# Patient Record
Sex: FEMALE | Race: WHITE
[De-identification: names, ages, dates, MRNs, and addresses within clinical notes are randomized per-mention and may not be internally consistent; named-entity substitution may affect disease eponyms.]

---

## 2017-10-19 ENCOUNTER — HOSPITAL ENCOUNTER (OUTPATIENT)
Dept: HOSPITAL 80 - CIMAGING | Age: 67
End: 2017-10-19
Attending: ORTHOPAEDIC SURGERY
Payer: COMMERCIAL

## 2017-10-19 DIAGNOSIS — Z01.818: Primary | ICD-10-CM

## 2017-10-19 DIAGNOSIS — M16.0: ICD-10-CM

## 2017-10-26 ENCOUNTER — HOSPITAL ENCOUNTER (INPATIENT)
Dept: HOSPITAL 80 - F3N | Age: 67
LOS: 1 days | Discharge: HOME | DRG: 470 | End: 2017-10-27
Attending: ORTHOPAEDIC SURGERY | Admitting: ORTHOPAEDIC SURGERY
Payer: COMMERCIAL

## 2017-10-26 VITALS — RESPIRATION RATE: 16 BRPM

## 2017-10-26 DIAGNOSIS — D68.51: ICD-10-CM

## 2017-10-26 DIAGNOSIS — M16.11: Primary | ICD-10-CM

## 2017-10-26 DIAGNOSIS — E03.9: ICD-10-CM

## 2017-10-26 LAB
INR PPP: 0.99 (ref 0.83–1.16)
PROTHROMBIN TIME: 13 SEC (ref 12–15)

## 2017-10-26 PROCEDURE — 0SR904Z REPLACEMENT OF RIGHT HIP JOINT WITH CERAMIC ON POLYETHYLENE SYNTHETIC SUBSTITUTE, OPEN APPROACH: ICD-10-PCS | Performed by: ORTHOPAEDIC SURGERY

## 2017-10-26 PROCEDURE — 8E0Y0CZ ROBOTIC ASSISTED PROCEDURE OF LOWER EXTREMITY, OPEN APPROACH: ICD-10-PCS | Performed by: ORTHOPAEDIC SURGERY

## 2017-10-26 RX ADMIN — FAMOTIDINE SCH MG: 20 TABLET, FILM COATED ORAL at 20:59

## 2017-10-26 RX ADMIN — OXYCODONE HYDROCHLORIDE PRN MG: 15 TABLET ORAL at 13:43

## 2017-10-26 RX ADMIN — OXYCODONE HYDROCHLORIDE PRN MG: 15 TABLET ORAL at 20:59

## 2017-10-26 RX ADMIN — OXYCODONE HYDROCHLORIDE PRN MG: 15 TABLET ORAL at 17:25

## 2017-10-26 RX ADMIN — Medication SCH MLS: at 13:41

## 2017-10-26 RX ADMIN — DOCUSATE SODIUM AND SENNOSIDES SCH TAB: 50; 8.6 TABLET ORAL at 20:59

## 2017-10-26 RX ADMIN — Medication SCH MLS: at 21:00

## 2017-10-26 RX ADMIN — ACETAMINOPHEN SCH MG: 325 TABLET ORAL at 12:43

## 2017-10-26 RX ADMIN — ACETAMINOPHEN SCH MG: 325 TABLET ORAL at 18:22

## 2017-10-26 RX ADMIN — ACETAMINOPHEN SCH MG: 325 TABLET ORAL at 23:48

## 2017-10-26 RX ADMIN — OXYCODONE HYDROCHLORIDE PRN MG: 15 TABLET ORAL at 23:48

## 2017-10-26 NOTE — POSTOPPROG
Post Op Note


Date of Operation: 10/26/17


Surgeon: Kelby Mauricio


Assistant: JESSICA Galvin


Anesthesiologist: Alicia


Pre-op Diagnosis: R hip end-stage OA


Post-op Diagnosis: same


Indication: Pain affecting ADLs refractory to nonoperative management


Procedure: Right anterior approach MARY 


Findings: see dictation


Inf/Abcess present in the surg proc area at time of surgery?: No


EBL: 100-500


Complications: 





none


Drains: Hemovac

## 2017-10-26 NOTE — GOP
[f rep st]



                                                                OPERATIVE REPORT





DATE OF OPERATION:  10/26/2017



SURGEON:  Kelby Mauricio MD



ASSISTANT:  RONI Sheppard.  Assistant was required for the procedure due to the complexity
 of the case and the patient's condition, for positioning, prepping, draping, retraction and closure.




ANESTHESIA:  Spinal with sedation.



PREOPERATIVE DIAGNOSIS:  Right hip end-stage osteoarthritis.



POSTOPERATIVE DIAGNOSIS:  Right hip end-stage osteoarthritis.



PROCEDURE PERFORMED:  Right hip anterior approach hip replacement with MAKOplasty robotic guidance.



FINDINGS:  



SPECIMENS:  None.



ESTIMATED BLOOD LOSS:  200 cc.



INDICATIONS:  The patient has severe hip osteoarthritis that failed to improve with conservative amador
ures significantly affecting activities of daily living including walking.  The patient elected to pr
oceed with anterior approach hip replacement using MAKOplasty robotic guidance after extensive discus
jameson of all possible approaches as well as the risks, benefits, pros, cons, expected recovery and pro
gnosis.  The patient verbalized an understanding of the risks and benefits of the procedure and herrera
d the informed consent prior to the procedure.



DESCRIPTION OF PROCEDURE:  The patient was seen in the holding area and the operative consent was sig
bryan.  The patient was taken to the operative room and spinal anesthesia was administered.  She was th
en placed into the supine position on the operative table with feet secured in a well-leg silva and 
arch table.  Hip and contralateral iliac crest were prepped and draped in the usual sterile fashion. 
The operative site was confirmed by signature.  Operative time-out was performed, allergies reviewed,
 antibiotics administered.  Three pins were placed in the contralateral iliac crest and the pelvic ar
ray was fixed and was well visualized by the robot. 



Desired incision for the anterior approach on the hip was infiltrated with 25% Marcaine with epinephr
ine.  The incision was made with a 10 blade, carried through subcutaneous tissue to identify the TFO 
fascia.  This was incised in line with the incision and the TFO was retracted laterally.  Lateral, fe
moral, circumflex vessels were coagulated with Aquamantys and electrocautery.  The deep TFO fascia wa
s incised and vastus lateralis was clearly exposed.  Pericapsular fat was excised. A T-shaped capsulo
shayna was performed and the capsule was preserved for later closure. 



Femoral Neil screw was then fixed into the anterior greater trochanter as was the checkpoint for the f
emur.  Femoral registration was performed using the robot.  Femoral neck cut was then performed under
 robotic guidance.  Femoral head was then excised with a corkscrew.  The acetabulum was then exposed 
in standard fashion.  Labrum and pulvinar tissue were excised sharpy.  Pelvic checkpoint was placed i
nto the AIIS. Acetabular registration was performed using the robot.  Reaming was then performed usin
g the robot to the desired size and depth.  The cup was impacted into place, again with robotic lucy
nce.  Good fixation was achieved. 



The cup was irrigated, dried and the liner was impacted into place achieving good locking within the 
cup. 



The femur was then exposed in the standard fashion.  The femur was broached to the desired size. Tria
l neck and head were attached and the hip was relocated.  Length and offset were confirmed using the 
robot.  The position of all components was confirmed with fluoroscopy as well.  The hip was then disl
ocated and the femoral trial components were removed and the stem was impacted into place.  Trunnion 
was cleaned and dried and the head was impacted down into the trunnion.  The wound was then copiously
 irrigated, including the cup, and pulse lavaged and the hip was once again relocated and final numbe
rs for length and offset were taken using the robot.  All checkpoints for the femoral array and femor
al array screw were removed.  The pelvic array was also removed.  It was then copiously irrigated wit
h sterile solution as well as a Betadine wash.  Topic tranexamic acid was also placed and allowed to 
sit for 3 minutes.  The capsule was then repaired with #1 Vicryl sutures.  Hemovac drain was then ethan
henrietta exiting distal and laterally from deep to TFO.  The wound was then closed in layers with an 0 Alvin
ll in the TFO and deep subcutaneous fat, 3-0 Versalok in the dermis.  The wound was then dressed with
 Dermabond, Steri-Strips and Mepilex dressing.  The patient was then safely awakened and taken to the
 recovery room in stable condition. 



All critical parts of the procedure were performed by myself, Dr. Mauricio.  This operative note was jarod rojas by myself, Dr. Mauricio and I am immediately available for emergency cross-coverage at all times.



DRAIN:  One Hemovac.



COMPLICATIONS:  None.



IMPLANTS:  Amity Tritanium cup size 54 with plus 0 polyethylene liner and Accolade II size 5 stem, 
127 degree offset, 36 mm plus 0 head.





Job #:  [id number]

## 2017-10-26 NOTE — PDANEPAE
ANE History of Present Illness





r hip osteoarthritis





ANE Past Medical History





- Cardiovascular History


Hx Hypertension: No


Hx Arrhythmias: No


Hx Chest Pain: No


Hx Coronary Artery / Peripheral Vascular Disease: No


Hx CHF / Valvular Disease: No


Hx Palpitations: No





- Pulmonary History


Hx COPD: No


Hx Asthma/Reactive Airway Disease: No


Hx Recent Upper Respiratory Infection: No


Hx Oxygen in Use at Home: No


Hx Sleep Apnea: No


Sleep Apnea Screening Result - Last Documented: Negative





- Neurologic History


Hx Cerebrovascular Accident: No


Hx Seizures: No


Hx Dementia: No





- Endocrine History


Hx Diabetes: No


Endocrine History Comment: hypothyroidism





- Renal History


Hx Renal Disorders: No





- Liver History


Hx Hepatic Disorders: No





- Neurological & Psychiatric Hx


Hx Neurological and Psychiatric Disorders: No





- Cancer History


Hx Cancer: Yes


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





- Congenital Disorder History


Hx Congenital Disorders: No





- GI History


Hx Gastrointestinal Disorders: No





- Other Health History


Other Health History: factor v- on coumadin





- Chronic Pain History


Chronic Pain: Yes (right hip and all joints)





- Surgical History


Prior Surgeries: shoulder repair 18-20 yrs ago with Dr. Chacon.  vein removed 

from leg.  bilateral tka.  bunionectomy.  hand surgery for tendon repair.  

tubal ligation 40 yrs ago.  skin ca removed- squamous cell





ANE Review of Systems


Review of Systems: 








- Exercise capacity


METS (RN): 4 METS





ANE Patient History





- Allergies


Allergies/Adverse Reactions: 








No Allergies [NKDA] Allergy (Verified 10/24/17 14:10)


 








- Home Medications


Home Medications: 








Acetaminophen [Tylenol 325mg (*)] 325 mg PO DAILY PRN 10/24/17 [Last Taken 1 

Week Ago ~10/19/17]


Enoxaparin [Lovenox 60 MG (*)] 60 mg SQ BID 10/24/17 [Last Taken 10/25/17 08:00]


Levothyroxine [Synthroid 50 mcg (*)] 50 mcg PO DAILY06 10/24/17 [Last Taken 10/

25/17 05:45]


Warfarin Sodium [Coumadin 5MG (*)] 5 mg PO HS 10/24/17 [Last Taken 10/20/17 20:

00]


Zolpidem Tartrate [Ambien 5MG (*)] 5 mg PO HS PRN 10/24/17 [Last Taken 10/25/17 

21:00]








- NPO status


NPO Since - Liquids (Date): 10/26/17


NPO Since - Liquids (Time): 04:45


NPO Since - Solids (Date): 10/25/17


NPO Since - Solids (Time): 19:00





- Smoking Hx


Smoking Status: Never smoked





- Family Anes Hx


Family Hx Anesthesia Complications: none





ANE Labs/Vital Signs





- Vital Signs


Blood Pressure: 134/74


Heart Rate: 73


Respiratory Rate: 18


O2 Sat (%): 94


Height: 171.45 cm


Weight: 69.4 kg





ANE Physical Exam





- Airway


Neck exam: FROM


Mallampati Score: Class 2


Mouth exam: normal dental/mouth exam





- Pulmonary


Pulmonary: no respiratory distress





- Cardiovascular


Cardiovascular: regular rate and rhythym





- ASA Status


ASA Status: II





ANE Anesthesia Plan


Anesthesia Plan: spinal


Total IV Anesthesia: Yes

## 2017-10-27 VITALS — OXYGEN SATURATION: 96 % | HEART RATE: 71 BPM | DIASTOLIC BLOOD PRESSURE: 50 MMHG | SYSTOLIC BLOOD PRESSURE: 123 MMHG

## 2017-10-27 VITALS — TEMPERATURE: 98.7 F

## 2017-10-27 LAB
HCT VFR BLD CALC: 34.1 % (ref 38–47)
HGB BLD-MCNC: 11.4 G/DL (ref 12.6–16.3)
INR PPP: 1.08 (ref 0.83–1.16)
PROTHROMBIN TIME: 13.9 SEC (ref 12–15)

## 2017-10-27 RX ADMIN — OXYCODONE HYDROCHLORIDE PRN MG: 15 TABLET ORAL at 03:19

## 2017-10-27 RX ADMIN — DOCUSATE SODIUM AND SENNOSIDES SCH TAB: 50; 8.6 TABLET ORAL at 09:10

## 2017-10-27 RX ADMIN — ACETAMINOPHEN SCH MG: 325 TABLET ORAL at 05:57

## 2017-10-27 RX ADMIN — FAMOTIDINE SCH MG: 20 TABLET, FILM COATED ORAL at 09:10

## 2017-10-27 NOTE — ASDISCHSUM
----------------------------------------------

Discharge Information

----------------------------------------------

Plan Status:Home with No Needs                       Medically Cleared to Leave:

Discharge Date:10/27/2017 10:01 AM                   CM D/C Disposition:Home, Routine, Self-Care

ADT D/C Disposition:Home, Routine, Self-Care         Projected Discharge Date:10/27/2017 10:01 AM

Transportation at D/C:                               Discharge Delay Reason:

Follow-Up Date:10/27/2017 10:01 AM                   Discharge Slot:

Final Diagnosis:

----------------------------------------------

Placement Information

----------------------------------------------

----------------------------------------------

Patient Contact Information

----------------------------------------------

Contact Name:KELLY                            Relationship:

Address:[address]                              Home [phone]

                                                     Work [phone]

City:[place]                                         Alternate Phone:

[facility]/Zip Code:[place] [address]                              Email:

----------------------------------------------

Financial Information

----------------------------------------------

Financial Class:

Primary Plan Desc:MEDICARE INPATIENT                 Primary Plan Number:[id number]

Secondary Plan Desc:[employer]/MDR SUPPLEMENT              Secondary Plan Number:[id number]

 

 

----------------------------------------------

Assessment Information

----------------------------------------------

----------------------------------------------

[facility] CM Progress Note

----------------------------------------------

CM Note

 

CM Note                       

Notes:

Pt medically stable for d/c, no CM d/c needs identified. 

 

Date Signed:  10/27/2017 10:14 AM

Electronically Signed By:MARISELA Bonilla

 

 

----------------------------------------------

Intervention Information

----------------------------------------------

## 2017-10-27 NOTE — SOAPPROG
SOAP Progress Note


Assessment/Plan: 


Assessment:


67F POD#1 s/p R anterior approach MARY with MAKOplasty robotic guidance


























Plan:


- WBAT with walker


- PT/OT eval


- Pain control with oxy ER, acetaminophen as inpt; percocet as outpatient


- IS 10x/hr


- DVT prophylaxis: 5mg Coumadin received last night; 40mg Lovenox this AM; 

Begin 60mg Lovenox DAILY (NOT BID) tomorrow AM until INR therapeutic


- Begin outpt PT next week


- d/c home today


- f/u in 2-3 wks in office for wound check.





10/27/17 08:24





Subjective: 


No acute events overnight. Pain very well controlled. Has ambulated 6-7x to the 

restroom with a walker without significant difficulty. Denies f/c/cp/sob/n/v/

numbness/tingling.





Objective: 





 Vital Signs











Temp Pulse Resp BP Pulse Ox


 


 37.1 C   71   16   123/50 H  96 


 


 10/27/17 03:31  10/27/17 08:00  10/27/17 08:00  10/27/17 08:00  10/27/17 08:00








 Laboratory Results





 10/27/17 04:52 





 











 10/26/17 10/27/17 10/28/17





 05:59 05:59 05:59


 


Intake Total  5050 


 


Output Total  4865 


 


Balance  185 








 











PT  13.9 SEC (12.0-15.0)   10/27/17  04:52    


 


INR  1.08  (0.83-1.16)   10/27/17  04:52    








RLE dressing c/d/i


No erythema drainage or signs of infection


Mild thigh swelling, but minimal TTP


Hemovac drain output 265cc since surgery. Drain d/c'ed this AM


SILT L3-S1


Intact EHL/FHL/TA/GSC


No calf pain or swelling


2+DP/PT pulses


TEDs, SCDs inplace


Pelvis XR reviewed from 10.26 revealing good alignment of components. 





- Time Spent With Patient


Time Spent With Patient: 





30mins





- Pending Discharge


Pending Discharge Within 24 Hours: Yes


Pending Discharge Date: 10/28/17


Pending Discharge Time: 11:00





ICD10 Worksheet


Patient Problems: 


 Problems











Problem Status Onset


 


Primary localized osteoarthritis of right hip Acute  














- ICD10 Problem Qualifiers


(1) Primary localized osteoarthritis of right hip

## 2017-10-27 NOTE — ASMTCMCOM
CM Note

 

CM Note                       

Notes:

Pt medically stable for d/c, no CM d/c needs identified. 

 

Date Signed:  10/27/2017 10:14 AM

Electronically Signed By:MARISELA Bonilla

## 2018-03-07 ENCOUNTER — HOSPITAL ENCOUNTER (OUTPATIENT)
Dept: HOSPITAL 80 - BRMIMAGING | Age: 68
End: 2018-03-07
Attending: INTERNAL MEDICINE
Payer: COMMERCIAL

## 2018-03-07 DIAGNOSIS — Z79.01: ICD-10-CM

## 2018-03-07 DIAGNOSIS — Z79.899: ICD-10-CM

## 2018-03-07 DIAGNOSIS — M85.89: ICD-10-CM

## 2018-03-07 DIAGNOSIS — Z78.0: ICD-10-CM

## 2018-03-07 DIAGNOSIS — Z12.31: Primary | ICD-10-CM

## 2018-03-07 DIAGNOSIS — Z13.820: ICD-10-CM

## 2022-04-09 NOTE — POSTANESTH
Post Anesthetic Evaluation


Cardiovascular Status: Normal, Stable


Respiratory Status: Normal, Stable


Level of Consciousness/Mental Status: Can Participate in Eval


Pain Control: Adequate, Prn Tx Ordered


Nausea/Vomiting Control: Adequate, Prn Tx Ordered


Complications Possibly Related to Anesthesia: None Noted show